# Patient Record
Sex: MALE | Race: WHITE | NOT HISPANIC OR LATINO | Employment: OTHER | URBAN - METROPOLITAN AREA
[De-identification: names, ages, dates, MRNs, and addresses within clinical notes are randomized per-mention and may not be internally consistent; named-entity substitution may affect disease eponyms.]

---

## 2017-04-04 ENCOUNTER — ALLSCRIPTS OFFICE VISIT (OUTPATIENT)
Dept: OTHER | Facility: OTHER | Age: 72
End: 2017-04-04

## 2017-11-28 ENCOUNTER — ALLSCRIPTS OFFICE VISIT (OUTPATIENT)
Dept: OTHER | Facility: OTHER | Age: 72
End: 2017-11-28

## 2017-11-28 ENCOUNTER — HOSPITAL ENCOUNTER (OUTPATIENT)
Dept: RADIOLOGY | Facility: HOSPITAL | Age: 72
Discharge: HOME | End: 2017-11-28
Attending: ORTHOPAEDIC SURGERY
Payer: MEDICARE

## 2017-11-28 DIAGNOSIS — M25.561 PAIN IN RIGHT KNEE: ICD-10-CM

## 2017-11-28 PROCEDURE — 73562 X-RAY EXAM OF KNEE 3: CPT

## 2017-11-29 NOTE — PROGRESS NOTES
Assessment    1  Right knee pain (759 46) (M25 561)   2  Primary osteoarthritis of right knee (565 16) (M17 11)    Plan  Right knee pain    · MethylPREDNISolone Acetate 40 MG/ML Injection Suspension   · * XR KNEE 3 VW RIGHT NON INJURY; Status:Active - Retrospective By ProtocolAuthorization; Requested for:28Nov2017;     Discussion/Summary  Left knee DJD  Plan is as follows:  Weightbearing as tolerated  Steroid injection to the knee  I did provide patient with hamstring stretching exercises  Follow-up in 3 months  Discussed treatment plan with patient, he is in agreement treatment plan  Thank you      Chief Complaint  1  Knee Pain    History of Present Illness  HPI: 60-year-old male presents at this time secondary left knee pain  Patient states that he has been having pain over the medial aspect of his knee and also posteriorly  States that he has minimal swelling, he has been doing his exercises  No numbness tingling fevers chills  Review of Systems    ROS reviewed  Active Problems  1  Right knee pain (254 46) (M25 561)    Past Medical History    The active problems and past medical history were reviewed and updated today  Surgical History    The surgical history was reviewed and updated today  Family History    The family history was reviewed and updated today  Social History   · Never a smoker  The social history was reviewed and updated today  Current Meds   1  BuPROPion HCl TABS Recorded    The medication list was reviewed and updated today  Allergies  1   NSAIDs    Vitals  Signs     Heart Rate: 76  Respiration: 20  Systolic: 556  Diastolic: 75  Weight: 498 lb 4 00 oz    Physical Exam  Left knee:  No abrasions, no open wounds, medial joint line tenderness, unable to elicit any pain with flexion of knee against resistance, I can elicit mild tenderness palpation over the medial hamstrings insertion site, otherwise stable to coronal and sagittal plane stress, neurologically and vascularly intact distally  Right knee:  No abrasions, no open wounds, no medial lateral joint line tenderness, stable to coronal and sagittal plane stress, neurologically and vascularly intact distally      Results/Data  I personally reviewed the films/images/results in the office today  My interpretation follows  X-ray Review Left knee: Minimal medial joint space narrowing, subchondral sclerosing, some flattening of the medial femoral condyle  Procedure  At injection of left knee secondary to pain  Patient total pros and cons  Alcohol ethyl chloride spray was used  Marcaine and 80 milligrams of Depo-Medrol was administered  Unable to aspirate any fluid from patient's knee  Patient tolerated this well  Band-Aid was placed        Signatures   Electronically signed by : SANDRA Perez ; Nov 28 2017  7:23PM EST                       (Author)

## 2018-01-13 VITALS
DIASTOLIC BLOOD PRESSURE: 75 MMHG | HEART RATE: 76 BPM | BODY MASS INDEX: 27.41 KG/M2 | WEIGHT: 184.25 LBS | RESPIRATION RATE: 20 BRPM | SYSTOLIC BLOOD PRESSURE: 122 MMHG

## 2018-01-14 VITALS
WEIGHT: 191.5 LBS | BODY MASS INDEX: 25.38 KG/M2 | DIASTOLIC BLOOD PRESSURE: 77 MMHG | SYSTOLIC BLOOD PRESSURE: 124 MMHG | HEART RATE: 65 BPM | RESPIRATION RATE: 20 BRPM | HEIGHT: 73 IN

## 2018-01-23 NOTE — RESULT NOTES
Verified Results  * XR KNEE 3 VW RIGHT NON INJURY 28Nov2017 01:17PM Anna Teran Order Number: [id number]   Performing Comments: Rm 10, Standing     Test Name Result Flag Reference   XR KNEE 3 VW RIGHT (Report)     RIGHT KNEE     INDICATION: Generalized knee pain  COMPARISON: None     VIEWS: 3     IMAGES: 3     FINDINGS:     There is no acute fracture or dislocation  There is no joint effusion  No degenerative changes  No lytic or blastic lesions are seen  Soft tissues are unremarkable  IMPRESSION:     No acute osseous abnormality         Workstation performed: [id number]     Signed by:   Cary Small DO   11/30/17       Plan  Right knee pain    · MethylPREDNISolone Acetate 40 MG/ML Injection Suspension   · * XR KNEE 3 VW RIGHT NON INJURY; Status:Complete;   Done: [id number] 01:17PM

## 2018-02-26 ENCOUNTER — TELEPHONE (OUTPATIENT)
Dept: OBGYN CLINIC | Facility: HOSPITAL | Age: 73
End: 2018-02-26

## 2018-02-26 NOTE — TELEPHONE ENCOUNTER
Caller: Jacqueline Ayers, wife  Call back number: [phone]  Patient's doctor: Dr Shayne Vickers    Patient was seen 11/28/17 for his right knee  Patient has been doing exercise and the knee is not getting any better  At the 11/28 appointment it was dicussed that the patient may need an MRI  Jacqueline yAers is asking if this can be ordered before they come in for a recheck   Please advise

## 2018-03-02 DIAGNOSIS — M25.561 RIGHT KNEE PAIN, UNSPECIFIED CHRONICITY: Primary | ICD-10-CM

## 2018-03-16 ENCOUNTER — HOSPITAL ENCOUNTER (OUTPATIENT)
Dept: MRI IMAGING | Facility: HOSPITAL | Age: 73
Discharge: HOME | End: 2018-03-16
Attending: PHYSICIAN ASSISTANT
Payer: MEDICARE

## 2018-03-16 DIAGNOSIS — M25.561 RIGHT KNEE PAIN, UNSPECIFIED CHRONICITY: ICD-10-CM

## 2018-03-16 PROCEDURE — 73721 MRI JNT OF LWR EXTRE W/O DYE: CPT

## 2018-03-20 ENCOUNTER — OFFICE VISIT (OUTPATIENT)
Dept: OBGYN CLINIC | Facility: HOSPITAL | Age: 73
End: 2018-03-20
Payer: MEDICARE

## 2018-03-20 VITALS
WEIGHT: 185.6 LBS | HEART RATE: 64 BPM | SYSTOLIC BLOOD PRESSURE: 121 MMHG | RESPIRATION RATE: 20 BRPM | BODY MASS INDEX: 24.49 KG/M2 | DIASTOLIC BLOOD PRESSURE: 76 MMHG

## 2018-03-20 DIAGNOSIS — M25.561 RIGHT KNEE PAIN, UNSPECIFIED CHRONICITY: ICD-10-CM

## 2018-03-20 DIAGNOSIS — M17.11 PRIMARY OSTEOARTHRITIS OF RIGHT KNEE: Primary | ICD-10-CM

## 2018-03-20 PROCEDURE — 99213 OFFICE O/P EST LOW 20 MIN: CPT | Performed by: ORTHOPAEDIC SURGERY

## 2018-03-20 RX ORDER — ACETAMINOPHEN 500 MG
500 TABLET ORAL EVERY 6 HOURS PRN
COMMUNITY

## 2018-03-21 NOTE — PROGRESS NOTES
Orthopedics          Sophie Villa  71 y o  male MRN: [id number]      Chief Complaint:   right knee pain    HPI:   71 y o male complaining of right knee pain  Patient has been diagnosed with right knee pain osteoarthritis several months ago  Patient received little benefit little pain relief from previous injection of steroid 3 months ago  Pain is aching in nature localized to the right knee  Pain is with activity worse with weight-bearing mildly relieved with rest   Patient does take Tylenol small minimally alleviates his pain he denies any significant swelling denies any fevers chills numbness tingling instability clicking popping catching of his right knee  Review Of Systems:   · Skin: Normal  · Neuro: See HPI  · Musculoskeletal: See HPI  · 14 point review of systems negative except as stated above     Past Medical History:   Past Medical History:   Diagnosis Date    Hemochromatosis        Past Surgical History:   Past Surgical History:   Procedure Laterality Date    CARDIAC SURGERY      VASCULAR SURGERY         Family History:  Family history reviewed and non-contributory  Family History   Problem Relation Age of Onset    Aneurysm Mother        Social History:  Social History     Social History    Marital status: /Civil Union     Spouse name: N/A    Number of children: N/A    Years of education: N/A     Social History Main Topics    Smoking status: Former Smoker    Smokeless tobacco: Never Used    Alcohol use No    Drug use: No    Sexual activity: Not Asked     Other Topics Concern    None     Social History Narrative    None       Allergies:    Allergies   Allergen Reactions    Nsaids        Labs:  No results found for: HCT, HGB, PT, INR, WBC, ESR, CRP    Meds:    Current Outpatient Prescriptions:     acetaminophen (TYLENOL) 500 mg tablet, Take 500 mg by mouth every 6 (six) hours as needed for mild pain, Disp: , Rfl:       Physical Exam:     General Appearance:    Alert, cooperative, no distress, appears stated age   Head:    Normocephalic, without obvious abnormality, atraumatic   Eyes:    conjunctiva/corneas clear, both eyes        Nose:   Nares normal, septum midline, no drainage    Throat:   Lips normal; teeth and gums normal   Neck:    symmetrical, trachea midline, ;     thyroid:  no enlargement/   Back:     Symmetric, no curvature, ROM normal   Lungs:   No audible wheezing or labored breathing   Chest Wall:    No tenderness or deformity    Heart:    Regular rate and rhythm               Pulses:   2+ and symmetric all extremities   Skin:   Skin color, texture, turgor normal, no rashes or lesions   Neurologic:   normal strength, sensation and reflexes     throughout       Musculoskeletal: right lower extremity  · On examination of the right knee there is no effusion, no erythema  Range of motion to full active extension and flexion to greater than 120°  Pain on palpation medial and lateral joint lines  There is crepitus with range of motion, no warmth to palpation, bony enlargement noted  No pain on palpation pes anserine bursa region or distal iliotibial band  Stable to varus and valgus stress without pain or gapping  Negative anterior and posterior drawer testing  Sensation intact distal pulses present      Radiology:   MRI of the right knee reviewed MRI does reveal evidence of degeneration specifically in the medial compartment degeneration as well as degenerative meniscal tears of the medial compartment    _*_*_*_*_*_*_*_*_*_*_*_*_*_*_*_*_*_*_*_*_*_*_*_*_*_*_*_*_*_*_*_*_*_*_*_*_*_*_*_*_*    Assessment:  72 y o male with right knee pain osteoarthritis    Plan:   · Weight bearing as tolerated  right lower extremity  · Patient interviewed and examined by Dr Robin Briggs and myself  · Prior authorization for viscosupplementation injection in the form of Synvisc-One  · Follow-up pending prior authorization of viscosupplementation injection  · Continue home range of motion quadriceps strengthening exercises until follow-up          Po Garay PA-C

## 2018-05-01 ENCOUNTER — OFFICE VISIT (OUTPATIENT)
Dept: OBGYN CLINIC | Facility: HOSPITAL | Age: 73
End: 2018-05-01
Payer: MEDICARE

## 2018-05-01 VITALS
BODY MASS INDEX: 24.92 KG/M2 | SYSTOLIC BLOOD PRESSURE: 123 MMHG | DIASTOLIC BLOOD PRESSURE: 73 MMHG | WEIGHT: 184 LBS | HEART RATE: 71 BPM | HEIGHT: 72 IN

## 2018-05-01 DIAGNOSIS — M17.11 ARTHRITIS OF RIGHT KNEE: Primary | ICD-10-CM

## 2018-05-01 PROCEDURE — 20610 DRAIN/INJ JOINT/BURSA W/O US: CPT | Performed by: ORTHOPAEDIC SURGERY

## 2018-05-01 NOTE — PROGRESS NOTES
Orthopedics   Perfecto Ugalde  71 y o  male MRN: [id number]  Unit/Bed#: @JOSE D@      Chief Complaint:   right knee pain    HPI:   71 y o male complaining of right knee pain  Patient presents at this time for Synvisc-One injection to right knee  Denies any increased swelling  Continues to have pain  Denies any numbness tingling fevers chills  Review Of Systems:   · Skin: Normal  · Neuro: See HPI  · Musculoskeletal: See HPI  · 14 point review of systems negative except as stated above     Past Medical History:   Past Medical History:   Diagnosis Date    Hemochromatosis        Past Surgical History:   Past Surgical History:   Procedure Laterality Date    CARDIAC SURGERY      VASCULAR SURGERY         Family History:  Family history reviewed and non-contributory  Family History   Problem Relation Age of Onset    Aneurysm Mother        Social History:  Social History     Social History    Marital status: /Civil Union     Spouse name: N/A    Number of children: N/A    Years of education: N/A     Social History Main Topics    Smoking status: Former Smoker    Smokeless tobacco: Never Used    Alcohol use No    Drug use: No    Sexual activity: Not on file     Other Topics Concern    Not on file     Social History Narrative    No narrative on file       Allergies:    Allergies   Allergen Reactions    Nsaids            Labs:  No results found for: HCT, HGB, PT, INR, WBC, ESR, CRP    Meds:    Current Outpatient Prescriptions:     acetaminophen (TYLENOL) 500 mg tablet, Take 500 mg by mouth every 6 (six) hours as needed for mild pain, Disp: , Rfl:     Blood Culture:   No results found for: BLOODCX    Wound Culture:   No results found for: WOUNDCULT    Ins and Outs:  [unfilled]          Physical Exam:   /73   Pulse 71   Ht 6' (1 829 m)   Wt 83 5 kg (184 lb)   BMI 24 95 kg/m²   Gen: Alert and oriented to person, place, time  HEENT: EOMI, eyes clear, moist mucus membranes, hearing intact  Respiratory: Bilateral chest rise  No audible wheezing found  Cardiovascular: Regular Rate and Rhythm  Abdomen: soft nontender/nondistended  Musculoskeletal: right lower extremity  · Skin intact  · No effusions, no open wounds  · Medial joint line tenderness, minimal lateral joint line tenderness, stable to coronal and sagittal plane stress  · Neurologically and vascularly intact distally    Radiology:   I personally reviewed the films       _*_*_*_*_*_*_*_*_*_*_*_*_*_*_*_*_*_*_*_*_*_*_*_*_*_*_*_*_*_*_*_*_*_*_*_*_*_*_*_*_*    Assessment:  72 y o male with right knee DJD    Plan:   · Weight bearing as tolerated  right lower extremity  · Synvisc-One injection to right knee  · Follow-up in 3 months  · Discussed treatment plan with patient and he is in agreement treatment plan    Thank you    Hayden Baker MD

## 2018-05-01 NOTE — PROGRESS NOTES
Large joint arthrocentesis  Date/Time: 5/1/2018 10:37 AM  Consent given by: patient  Supporting Documentation  Indications: pain   Procedure Details  Location: knee - R knee  Needle size: 22 G  Medications administered: 48 mg hylan 48 MG/6ML

## 2021-04-08 DIAGNOSIS — Z23 ENCOUNTER FOR IMMUNIZATION: ICD-10-CM

## 2021-11-30 ENCOUNTER — HOSPITAL ENCOUNTER (EMERGENCY)
Facility: HOSPITAL | Age: 76
Discharge: HOME | End: 2021-11-30
Attending: EMERGENCY MEDICINE
Payer: MEDICARE

## 2021-11-30 ENCOUNTER — APPOINTMENT (EMERGENCY)
Dept: RADIOLOGY | Facility: HOSPITAL | Age: 76
End: 2021-11-30
Payer: MEDICARE

## 2021-11-30 VITALS
HEART RATE: 85 BPM | RESPIRATION RATE: 18 BRPM | OXYGEN SATURATION: 98 % | TEMPERATURE: 98.4 F | SYSTOLIC BLOOD PRESSURE: 128 MMHG | WEIGHT: 167 LBS | BODY MASS INDEX: 22.65 KG/M2 | DIASTOLIC BLOOD PRESSURE: 67 MMHG
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
## 2021-11-30 DIAGNOSIS — T14.8XXA PUNCTURE WOUND: Primary | ICD-10-CM

## 2021-11-30 PROCEDURE — 90715 TDAP VACCINE 7 YRS/> IM: CPT | Performed by: EMERGENCY MEDICINE

## 2021-11-30 PROCEDURE — 99283 EMERGENCY DEPT VISIT LOW MDM: CPT

## 2021-11-30 PROCEDURE — 90471 IMMUNIZATION ADMIN: CPT

## 2021-11-30 PROCEDURE — 99284 EMERGENCY DEPT VISIT MOD MDM: CPT | Performed by: EMERGENCY MEDICINE

## 2021-11-30 PROCEDURE — 73630 X-RAY EXAM OF FOOT: CPT

## 2021-11-30 RX ORDER — AMOXICILLIN AND CLAVULANATE POTASSIUM 875; 125 MG/1; MG/1
1 TABLET, FILM COATED ORAL EVERY 12 HOURS
Qty: 10 TABLET | Refills: 0 | Status: SHIPPED | OUTPATIENT
Start: 2021-11-30 | End: 2021-12-05

## 2021-11-30 RX ORDER — AMOXICILLIN AND CLAVULANATE POTASSIUM 875; 125 MG/1; MG/1
1 TABLET, FILM COATED ORAL ONCE
Status: COMPLETED | OUTPATIENT
Start: 2021-11-30 | End: 2021-11-30

## 2021-11-30 RX ADMIN — AMOXICILLIN AND CLAVULANATE POTASSIUM 1 TABLET: 875; 125 TABLET, FILM COATED ORAL at 22:19

## 2021-11-30 RX ADMIN — TETANUS TOXOID, REDUCED DIPHTHERIA TOXOID AND ACELLULAR PERTUSSIS VACCINE, ADSORBED 0.5 ML: 5; 2.5; 8; 8; 2.5 SUSPENSION INTRAMUSCULAR at 22:19

## 2025-07-25 ENCOUNTER — TELEPHONE (OUTPATIENT)
Age: 80
End: 2025-07-25

## 2025-07-25 DIAGNOSIS — M25.561 RIGHT KNEE PAIN, UNSPECIFIED CHRONICITY: ICD-10-CM

## 2025-07-25 DIAGNOSIS — M25.562 LEFT KNEE PAIN, UNSPECIFIED CHRONICITY: Primary | ICD-10-CM

## 2025-07-25 NOTE — TELEPHONE ENCOUNTER
Caller: Marissa on Oswaldo's behalf    Doctor: Dr. Leggett    Reason for call: Time to setup another round of [employer]; pt has new insurance unsure if anything new needs to be done    BS MCR Adv [id number]     Call back#: [phone]

## 2025-08-01 PROBLEM — F32.A DEPRESSION: Status: ACTIVE | Noted: 2025-08-01

## 2025-08-01 PROBLEM — M67.40 GANGLION CYST: Status: ACTIVE | Noted: 2025-08-01

## 2025-08-01 PROBLEM — Z77.090 H/O ASBESTOS EXPOSURE: Status: ACTIVE | Noted: 2025-08-01

## 2025-08-01 PROBLEM — E87.5 HYPERKALEMIA: Status: ACTIVE | Noted: 2025-08-01

## 2025-08-01 PROBLEM — K59.00 CONSTIPATION: Status: ACTIVE | Noted: 2025-08-01

## 2025-08-01 PROBLEM — M17.11 OSTEOARTHRITIS OF RIGHT KNEE: Status: ACTIVE | Noted: 2025-08-01

## 2025-08-01 PROBLEM — I73.00 RAYNAUD PHENOMENON: Status: ACTIVE | Noted: 2025-08-01

## 2025-08-01 PROBLEM — Z86.73 CEREBELLAR CEREBROVASCULAR ACCIDENT (CVA) WITHOUT LATE EFFECT: Status: ACTIVE | Noted: 2025-08-01

## 2025-08-01 PROBLEM — E83.110 HEMOCHROMATOSIS ASSOCIATED WITH COMPOUND HETEROZYGOUS MUTATION IN HFE GENE (HCC): Status: ACTIVE | Noted: 2025-08-01

## 2025-08-01 PROBLEM — H91.90 HEARING LOSS: Status: ACTIVE | Noted: 2025-08-01

## 2025-08-01 PROBLEM — Z87.898: Status: ACTIVE | Noted: 2025-08-01

## 2025-08-01 PROBLEM — N40.0 BPH (BENIGN PROSTATIC HYPERPLASIA): Status: ACTIVE | Noted: 2025-08-01

## 2025-08-01 PROBLEM — M54.16 LUMBAR RADICULITIS: Status: ACTIVE | Noted: 2025-08-01

## 2025-08-01 PROBLEM — G20.A1 PARKINSON'S DISEASE (HCC): Status: ACTIVE | Noted: 2025-08-01

## 2025-08-01 PROBLEM — R63.4 WEIGHT LOSS: Status: ACTIVE | Noted: 2025-08-01

## 2025-08-01 PROBLEM — I70.0 ATHEROSCLEROSIS OF AORTA (HCC): Status: ACTIVE | Noted: 2025-08-01

## 2025-08-01 PROBLEM — N28.1 KIDNEY CYSTS: Status: ACTIVE | Noted: 2025-08-01

## 2025-08-01 PROBLEM — I34.0 MITRAL REGURGITATION: Status: ACTIVE | Noted: 2025-08-01

## 2025-08-01 PROBLEM — M19.90 OSTEOARTHRITIS: Status: ACTIVE | Noted: 2025-08-01

## 2025-08-01 PROBLEM — M25.561 RIGHT KNEE PAIN: Status: ACTIVE | Noted: 2025-08-01

## 2025-08-01 PROBLEM — F33.1 MAJOR DEPRESSIVE DISORDER, RECURRENT, MODERATE (HCC): Status: ACTIVE | Noted: 2025-08-01

## 2025-08-01 PROBLEM — Q21.12 PATENT FORAMEN OVALE: Status: ACTIVE | Noted: 2025-08-01

## 2025-08-01 PROBLEM — M94.0 COSTOCHONDRITIS: Status: ACTIVE | Noted: 2025-08-01

## 2025-08-01 PROBLEM — J30.9 ALLERGIC RHINITIS: Status: ACTIVE | Noted: 2025-08-01

## 2025-08-04 ENCOUNTER — TELEPHONE (OUTPATIENT)
Age: 80
End: 2025-08-04

## 2025-08-06 ENCOUNTER — PROCEDURE VISIT (OUTPATIENT)
Age: 80
End: 2025-08-06

## 2025-08-06 VITALS — WEIGHT: 165 LBS | BODY MASS INDEX: 22.35 KG/M2 | HEIGHT: 72 IN

## 2025-08-06 DIAGNOSIS — M17.11 PRIMARY OSTEOARTHRITIS OF RIGHT KNEE: Primary | ICD-10-CM

## 2025-08-06 RX ORDER — SERTRALINE HYDROCHLORIDE 100 MG/1
100 TABLET, FILM COATED ORAL DAILY
COMMUNITY
Start: 2025-07-15

## 2025-08-06 RX ORDER — LEVODOPA 42 MG/1
84 CAPSULE RESPIRATORY (INHALATION)
COMMUNITY
Start: 2025-03-03

## 2025-08-06 RX ORDER — CARBIDOPA AND LEVODOPA 25; 100 MG/1; MG/1
TABLET, EXTENDED RELEASE ORAL
COMMUNITY
Start: 2025-05-16

## 2025-08-06 RX ORDER — ASPIRIN 81 MG/1
81 TABLET ORAL DAILY
COMMUNITY
Start: 2025-03-03

## 2025-08-06 RX ORDER — ATORVASTATIN CALCIUM 20 MG/1
20 TABLET, FILM COATED ORAL DAILY
COMMUNITY
Start: 2025-03-03

## 2025-08-06 RX ORDER — AMANTADINE HYDROCHLORIDE 100 MG/1
100 TABLET ORAL DAILY
COMMUNITY
Start: 2025-05-16

## 2025-08-06 RX ORDER — RIVASTIGMINE 9.5 MG/24H
1 PATCH, EXTENDED RELEASE TRANSDERMAL DAILY
COMMUNITY
Start: 2025-03-03

## 2025-08-06 RX ORDER — CARBIDOPA AND LEVODOPA 25; 100 MG/1; MG/1
1 TABLET ORAL
COMMUNITY
Start: 2025-03-03

## 2025-08-06 RX ORDER — MIRABEGRON 50 MG/1
50 TABLET, FILM COATED, EXTENDED RELEASE ORAL DAILY
COMMUNITY
Start: 2025-07-23

## 2025-08-13 ENCOUNTER — PROCEDURE VISIT (OUTPATIENT)
Age: 80
End: 2025-08-13
Payer: MEDICARE

## 2025-08-20 ENCOUNTER — PROCEDURE VISIT (OUTPATIENT)
Age: 80
End: 2025-08-20
Payer: MEDICARE

## 2025-08-20 VITALS — HEIGHT: 72 IN | BODY MASS INDEX: 23.16 KG/M2 | WEIGHT: 171 LBS

## 2025-08-20 DIAGNOSIS — M17.11 PRIMARY OSTEOARTHRITIS OF RIGHT KNEE: Primary | ICD-10-CM

## 2025-08-20 PROCEDURE — 20610 DRAIN/INJ JOINT/BURSA W/O US: CPT | Performed by: PHYSICIAN ASSISTANT
